# Patient Record
Sex: MALE | Race: WHITE | Employment: FULL TIME | URBAN - METROPOLITAN AREA
[De-identification: names, ages, dates, MRNs, and addresses within clinical notes are randomized per-mention and may not be internally consistent; named-entity substitution may affect disease eponyms.]

---

## 2019-04-08 ENCOUNTER — OFFICE VISIT (OUTPATIENT)
Dept: PRIMARY CARE CLINIC | Age: 29
End: 2019-04-08
Payer: COMMERCIAL

## 2019-04-08 VITALS
TEMPERATURE: 98.8 F | BODY MASS INDEX: 30.46 KG/M2 | WEIGHT: 212.8 LBS | HEIGHT: 70 IN | SYSTOLIC BLOOD PRESSURE: 122 MMHG | OXYGEN SATURATION: 98 % | DIASTOLIC BLOOD PRESSURE: 72 MMHG | HEART RATE: 93 BPM

## 2019-04-08 DIAGNOSIS — J02.0 STREP THROAT: Primary | ICD-10-CM

## 2019-04-08 LAB — S PYO AG THROAT QL: POSITIVE

## 2019-04-08 PROCEDURE — 87880 STREP A ASSAY W/OPTIC: CPT | Performed by: PHYSICIAN ASSISTANT

## 2019-04-08 PROCEDURE — G8427 DOCREV CUR MEDS BY ELIG CLIN: HCPCS | Performed by: PHYSICIAN ASSISTANT

## 2019-04-08 PROCEDURE — 99213 OFFICE O/P EST LOW 20 MIN: CPT | Performed by: PHYSICIAN ASSISTANT

## 2019-04-08 PROCEDURE — 1036F TOBACCO NON-USER: CPT | Performed by: PHYSICIAN ASSISTANT

## 2019-04-08 PROCEDURE — G8419 CALC BMI OUT NRM PARAM NOF/U: HCPCS | Performed by: PHYSICIAN ASSISTANT

## 2019-04-08 RX ORDER — AMOXICILLIN 500 MG/1
500 CAPSULE ORAL 2 TIMES DAILY
Qty: 20 CAPSULE | Refills: 0 | Status: SHIPPED | OUTPATIENT
Start: 2019-04-08 | End: 2019-04-18

## 2019-04-08 ASSESSMENT — PATIENT HEALTH QUESTIONNAIRE - PHQ9
SUM OF ALL RESPONSES TO PHQ9 QUESTIONS 1 & 2: 0
2. FEELING DOWN, DEPRESSED OR HOPELESS: 0
SUM OF ALL RESPONSES TO PHQ QUESTIONS 1-9: 0
1. LITTLE INTEREST OR PLEASURE IN DOING THINGS: 0
SUM OF ALL RESPONSES TO PHQ QUESTIONS 1-9: 0

## 2019-04-08 NOTE — PROGRESS NOTES
[address] [facility] PRIMARY CARE  [id number] [facility]  Dept: 350 Sara Skinner is a 25 y.o. male who presents today for his medical conditions/complaints as noted below. Chief Complaint   Patient presents with    Pharyngitis     Pt states sore throat started last night. Pt denies cough    Emesis     Pt states he vomited once this morning.  Fever     Fever of 100 this morning. Pt has taken Tylenol- last dose 930 this morning. HPI:     HPI   Severe sore throat since last night. Vomited once this morning when trying to swallow. No diarrhea. No abdominal pain. +HA. Max temp of 100F. No congestion or runny nose. Only coughs due to throat irrigation. Strep throat is going around his son's . No results found for: LDLCHOLESTEROL, LDLCALC    (goal LDL is <100)   No results found for: AST, ALT, BUN  BP Readings from Last 3 Encounters:   04/11/19 122/78   04/08/19 122/72   12/18/15 111/66          (goal 120/80)    History reviewed. No pertinent past medical history. Past Surgical History:   Procedure Laterality Date    PILONIDAL CYST EXCISION  12-18-15       Family History   Problem Relation Age of Onset    High Blood Pressure Father     Heart Attack Father     High Cholesterol Father     High Cholesterol Paternal Uncle     Diabetes Maternal Aunt        Social History     Tobacco Use    Smoking status: Never Smoker    Smokeless tobacco: Never Used   Substance Use Topics    Alcohol use: Yes     Comment: rare      Current Outpatient Medications   Medication Sig Dispense Refill    amoxicillin (AMOXIL) 500 MG capsule Take 1 capsule by mouth 2 times daily for 10 days 20 capsule 0     No current facility-administered medications for this visit.       No Known Allergies    Health Maintenance   Topic Date Due    Varicella Vaccine (1 of 2 - 13+ 2-dose series) 10/25/2001    HIV screen  10/25/2003    Flu vaccine (Season

## 2019-04-11 ENCOUNTER — OFFICE VISIT (OUTPATIENT)
Dept: PRIMARY CARE CLINIC | Age: 29
End: 2019-04-11
Payer: COMMERCIAL

## 2019-04-11 VITALS
WEIGHT: 210.6 LBS | DIASTOLIC BLOOD PRESSURE: 78 MMHG | OXYGEN SATURATION: 98 % | SYSTOLIC BLOOD PRESSURE: 122 MMHG | HEIGHT: 69 IN | BODY MASS INDEX: 31.19 KG/M2 | HEART RATE: 84 BPM

## 2019-04-11 DIAGNOSIS — Z13.6 ENCOUNTER FOR LIPID SCREENING FOR CARDIOVASCULAR DISEASE: ICD-10-CM

## 2019-04-11 DIAGNOSIS — Z02.1 PHYSICAL EXAM, PRE-EMPLOYMENT: Primary | ICD-10-CM

## 2019-04-11 DIAGNOSIS — Z13.1 SCREENING FOR DIABETES MELLITUS (DM): ICD-10-CM

## 2019-04-11 DIAGNOSIS — Z13.220 ENCOUNTER FOR LIPID SCREENING FOR CARDIOVASCULAR DISEASE: ICD-10-CM

## 2019-04-11 PROCEDURE — 99395 PREV VISIT EST AGE 18-39: CPT | Performed by: PHYSICIAN ASSISTANT

## 2019-04-11 ASSESSMENT — ENCOUNTER SYMPTOMS
NAUSEA: 0
DIARRHEA: 0
COUGH: 0
BLOOD IN STOOL: 0
VOMITING: 0
BACK PAIN: 0
CONSTIPATION: 0
SHORTNESS OF BREATH: 0
RHINORRHEA: 0
ABDOMINAL PAIN: 0

## 2019-04-11 NOTE — PROGRESS NOTES
Vaccine (1 of 2 - 13+ 2-dose series) 10/25/2001    HIV screen  10/25/2003    Flu vaccine (Season Ended) 09/01/2019    DTaP/Tdap/Td vaccine (3 - Td) 09/22/2025    Pneumococcal 0-64 years Vaccine  Aged Out       Subjective:      Review of Systems   Constitutional: Negative for fever. HENT: Negative for congestion, rhinorrhea and sore throat (resolved). Eyes: Negative for visual disturbance (wears glasses, has seen eye doctor in last year). Respiratory: Negative for cough and shortness of breath. Cardiovascular: Negative for chest pain. Gastrointestinal: Negative for abdominal pain, blood in stool, constipation, diarrhea, nausea and vomiting. Genitourinary: Negative for dysuria. Musculoskeletal: Negative for arthralgias and back pain. Skin: Negative for rash. Neurological: Negative for light-headedness and headaches. Psychiatric/Behavioral: Negative for dysphoric mood. The patient is not nervous/anxious. Objective:     /78   Pulse 84   Ht 5' 9\" (1.753 m)   Wt 210 lb 9.6 oz (95.5 kg)   SpO2 98%   BMI 31.10 kg/m²   Physical Exam   Constitutional: He appears well-developed and well-nourished. No distress. HENT:   Head: Normocephalic and atraumatic. Right Ear: Tympanic membrane, external ear and ear canal normal.   Left Ear: Tympanic membrane, external ear and ear canal normal.   Mouth/Throat: Posterior oropharyngeal erythema (mild) present. No oropharyngeal exudate. Eyes: Pupils are equal, round, and reactive to light. EOM are normal.   Cardiovascular: Normal rate, regular rhythm and normal heart sounds. Pulmonary/Chest: Effort normal and breath sounds normal.   Abdominal: Soft. Bowel sounds are normal. He exhibits no distension. There is no tenderness. There is no rebound and no guarding. Musculoskeletal: Normal range of motion. Normal strength   Lymphadenopathy:     He has no cervical adenopathy. Skin: He is not diaphoretic.    Psychiatric: He has a normal mood and affect. Nursing note and vitals reviewed. Assessment:       Diagnosis Orders   1. Physical exam, pre-employment  Lipid Panel    Glucose, Fasting   2. Screening for diabetes mellitus (DM)  Glucose, Fasting   3. Encounter for lipid screening for cardiovascular disease  Lipid Panel        Plan:    Signed form stating that pt able to do physical ability tests. Ordered screening labs for glucose and cholesterol. Return in about 1 year (around 4/11/2020) for Annual Physical.    Orders Placed This Encounter   Procedures    Lipid Panel     Standing Status:   Future     Standing Expiration Date:   4/11/2020     Order Specific Question:   Is Patient Fasting?/# of Hours     Answer:   yes    Glucose, Fasting     Standing Status:   Future     Standing Expiration Date:   4/11/2020     No orders of the defined types were placed in this encounter. Patient given educationalmaterials - see patient instructions. Discussed use, benefit, and side effectsof prescribed medications. All patient questions answered. Pt voiced understanding. Reviewed health maintenance. Instructed to continue current medications, diet andexercise. Patient agreed with treatment plan. Follow up as directed.      Electronicallysigned by Bessy Whiting PA-C on 4/17/2019 at 10:39 AM

## 2019-04-14 ASSESSMENT — ENCOUNTER SYMPTOMS
ABDOMINAL PAIN: 0
RHINORRHEA: 0
VOMITING: 1
DIARRHEA: 0
SORE THROAT: 1

## 2019-04-17 ASSESSMENT — ENCOUNTER SYMPTOMS: SORE THROAT: 0

## 2019-05-09 ENCOUNTER — OFFICE VISIT (OUTPATIENT)
Dept: PRIMARY CARE CLINIC | Age: 29
End: 2019-05-09
Payer: COMMERCIAL

## 2019-05-09 VITALS
BODY MASS INDEX: 30.6 KG/M2 | SYSTOLIC BLOOD PRESSURE: 112 MMHG | OXYGEN SATURATION: 99 % | WEIGHT: 207.2 LBS | TEMPERATURE: 98.2 F | DIASTOLIC BLOOD PRESSURE: 74 MMHG | HEART RATE: 72 BPM

## 2019-05-09 DIAGNOSIS — J20.9 ACUTE BRONCHITIS, UNSPECIFIED ORGANISM: Primary | ICD-10-CM

## 2019-05-09 PROCEDURE — 99213 OFFICE O/P EST LOW 20 MIN: CPT | Performed by: PHYSICIAN ASSISTANT

## 2019-05-09 PROCEDURE — G8427 DOCREV CUR MEDS BY ELIG CLIN: HCPCS | Performed by: PHYSICIAN ASSISTANT

## 2019-05-09 PROCEDURE — 1036F TOBACCO NON-USER: CPT | Performed by: PHYSICIAN ASSISTANT

## 2019-05-09 PROCEDURE — G8417 CALC BMI ABV UP PARAM F/U: HCPCS | Performed by: PHYSICIAN ASSISTANT

## 2019-05-09 RX ORDER — AZITHROMYCIN 250 MG/1
TABLET, FILM COATED ORAL
Qty: 1 PACKET | Refills: 0 | Status: SHIPPED | OUTPATIENT
Start: 2019-05-09 | End: 2019-05-19

## 2019-05-09 RX ORDER — BENZONATATE 100 MG/1
100 CAPSULE ORAL 3 TIMES DAILY PRN
Qty: 30 CAPSULE | Refills: 0 | Status: SHIPPED | OUTPATIENT
Start: 2019-05-09 | End: 2020-02-17

## 2019-05-09 ASSESSMENT — ENCOUNTER SYMPTOMS
SHORTNESS OF BREATH: 0
WHEEZING: 0
COUGH: 1
CHEST TIGHTNESS: 0

## 2019-05-09 NOTE — PROGRESS NOTES
Topic Date Due    Varicella Vaccine (1 of 2 - 13+ 2-dose series) 10/25/2001    HIV screen  10/25/2003    Flu vaccine (Season Ended) 09/01/2019    DTaP/Tdap/Td vaccine (3 - Td) 09/22/2025    Pneumococcal 0-64 years Vaccine  Aged Out       Subjective:      Review of Systems   Constitutional: Negative for fever. HENT: Positive for congestion. Negative for ear pain, rhinorrhea, sinus pressure and sore throat. Respiratory: Positive for cough. Negative for chest tightness, shortness of breath and wheezing. Objective:     /74   Pulse 72   Temp 98.2 °F (36.8 °C)   Wt 207 lb 3.2 oz (94 kg)   SpO2 99%   BMI 30.60 kg/m²   Physical Exam   Constitutional: He appears well-developed and well-nourished. No distress. HENT:   Head: Normocephalic and atraumatic. Right Ear: Tympanic membrane, external ear and ear canal normal.   Left Ear: External ear and ear canal normal. Tympanic membrane is injected (mild). Tympanic membrane is not bulging. Nose: Right sinus exhibits no maxillary sinus tenderness and no frontal sinus tenderness. Left sinus exhibits no maxillary sinus tenderness and no frontal sinus tenderness. Mouth/Throat: No oropharyngeal exudate. Cardiovascular: Normal rate, regular rhythm and normal heart sounds. Pulmonary/Chest: Effort normal and breath sounds normal. No respiratory distress. He has no wheezes. Lymphadenopathy:     He has no cervical adenopathy. Skin: Skin is warm and dry. No rash noted. He is not diaphoretic. Nursing note and vitals reviewed. Assessment:       Diagnosis Orders   1. Acute bronchitis, unspecified organism  azithromycin (ZITHROMAX) 250 MG tablet    benzonatate (TESSALON) 100 MG capsule        Plan:    -Rx for z-rosa since pt has been coughing x 3 weeks.   -Continue mucinex in day for chest congestion, but can try perles at night to suppress cough. Return if symptoms worsen or fail to improve.     No orders of the defined types were placed in this encounter. Orders Placed This Encounter   Medications    azithromycin (ZITHROMAX) 250 MG tablet     Si tablets now then 1 daily until gone. Dispense:  1 packet     Refill:  0    benzonatate (TESSALON) 100 MG capsule     Sig: Take 1 capsule by mouth 3 times daily as needed for Cough     Dispense:  30 capsule     Refill:  0       Patient given educationalmaterials - see patient instructions. Discussed use, benefit, and side effectsof prescribed medications. All patient questions answered. Pt voiced understanding. Reviewed health maintenance. Instructed to continue current medications, diet andexercise. Patient agreed with treatment plan. Follow up as directed.      Electronicallysigned by Ankita Sanchez PA-C on 5/15/2019 at 10:49 PM

## 2019-05-15 ASSESSMENT — ENCOUNTER SYMPTOMS
RHINORRHEA: 0
SORE THROAT: 0
SINUS PRESSURE: 0

## 2019-05-24 LAB
ALBUMIN SERPL-MCNC: NORMAL G/DL
ALP BLD-CCNC: NORMAL U/L
ALT SERPL-CCNC: NORMAL U/L
ANION GAP SERPL CALCULATED.3IONS-SCNC: NORMAL MMOL/L
AST SERPL-CCNC: NORMAL U/L
BILIRUB SERPL-MCNC: NORMAL MG/DL (ref 0.1–1.4)
BILIRUBIN, URINE: NORMAL
BLOOD, URINE: NORMAL
BUN BLDV-MCNC: NORMAL MG/DL
CALCIUM SERPL-MCNC: NORMAL MG/DL
CHLORIDE BLD-SCNC: NORMAL MMOL/L
CHOLESTEROL, TOTAL: 164 MG/DL
CHOLESTEROL/HDL RATIO: 4.2
CLARITY: NORMAL
CO2: NORMAL MMOL/L
COLOR: NORMAL
CREAT SERPL-MCNC: NORMAL MG/DL
GFR CALCULATED: NORMAL
GLUCOSE BLD-MCNC: 92 MG/DL
GLUCOSE URINE: NORMAL
HDLC SERPL-MCNC: 39 MG/DL (ref 35–70)
KETONES, URINE: NORMAL
LDL CHOLESTEROL CALCULATED: 110 MG/DL (ref 0–160)
LEUKOCYTE ESTERASE, URINE: NORMAL
NITRITE, URINE: NORMAL
PH UA: NORMAL (ref 4.5–8)
POTASSIUM SERPL-SCNC: NORMAL MMOL/L
PROTEIN UA: NORMAL
SODIUM BLD-SCNC: NORMAL MMOL/L
SPECIFIC GRAVITY, URINE: NORMAL
TOTAL PROTEIN: NORMAL
TRIGL SERPL-MCNC: 66 MG/DL
UROBILINOGEN, URINE: NORMAL
VLDLC SERPL CALC-MCNC: NORMAL MG/DL

## 2019-10-22 ENCOUNTER — TELEPHONE (OUTPATIENT)
Dept: PRIMARY CARE CLINIC | Age: 29
End: 2019-10-22

## 2020-02-17 ENCOUNTER — OFFICE VISIT (OUTPATIENT)
Dept: PRIMARY CARE CLINIC | Age: 30
End: 2020-02-17
Payer: COMMERCIAL

## 2020-02-17 ENCOUNTER — NURSE TRIAGE (OUTPATIENT)
Dept: OTHER | Facility: CLINIC | Age: 30
End: 2020-02-17

## 2020-02-17 VITALS
SYSTOLIC BLOOD PRESSURE: 110 MMHG | HEART RATE: 74 BPM | WEIGHT: 208.6 LBS | BODY MASS INDEX: 30.8 KG/M2 | DIASTOLIC BLOOD PRESSURE: 74 MMHG | OXYGEN SATURATION: 98 %

## 2020-02-17 PROCEDURE — 99213 OFFICE O/P EST LOW 20 MIN: CPT | Performed by: PHYSICIAN ASSISTANT

## 2020-02-17 ASSESSMENT — PATIENT HEALTH QUESTIONNAIRE - PHQ9
SUM OF ALL RESPONSES TO PHQ QUESTIONS 1-9: 0
SUM OF ALL RESPONSES TO PHQ9 QUESTIONS 1 & 2: 0
SUM OF ALL RESPONSES TO PHQ QUESTIONS 1-9: 0
2. FEELING DOWN, DEPRESSED OR HOPELESS: 0
1. LITTLE INTEREST OR PLEASURE IN DOING THINGS: 0

## 2020-02-17 ASSESSMENT — ENCOUNTER SYMPTOMS
RESPIRATORY NEGATIVE: 1
EYES NEGATIVE: 1
ABDOMINAL PAIN: 0
COLOR CHANGE: 0

## 2020-02-17 NOTE — PROGRESS NOTES
[facility] Primary Care  [address]  Phone: [phone]  Fax: [phone]    Rachel Magallanes is a 30 y.o. male who presents today for his medical conditions/complaintsas noted below. Chief Complaint   Patient presents with    Rash     patient has rash on the side of his right wye, patient said rash does not itch but was tender to the touch last week. HPI:     HPI  Rash around right eye, stared last week. NO new exposures. Wears daily disposable contacts. NO eye drops. Has used Eucerin that has helped. Also stopped wearing his contacts. No hx of eczema, but does have sensitive skin. Current Outpatient Medications   Medication Sig Dispense Refill    Crisaborole (EUCRISA) 2 % OINT Apply topically bid for 3-5 days 1 Tube 0     No current facility-administered medications for this visit. No Known Allergies    Subjective:      Review of Systems   Constitutional: Negative for chills, diaphoresis, fever and unexpected weight change. HENT: Negative. Negative for mouth sores. Eyes: Negative. Respiratory: Negative. Cardiovascular: Negative. Gastrointestinal: Negative for abdominal pain. Musculoskeletal: Negative for arthralgias and myalgias. Skin: Positive for rash ( near right eye). Negative for color change, pallor and wound. Allergic/Immunologic: Negative for environmental allergies, food allergies and immunocompromised state. Hematological: Negative for adenopathy. Objective:     /74   Pulse 74   Wt 208 lb 9.6 oz (94.6 kg)   SpO2 98%   BMI 30.80 kg/m²   Physical Exam  Skin:     Findings: Rash (maculopapular rash that looks like something is dripping out of eye and settling causing the rash. ) present. Assessment:       Diagnosis Orders   1. Irritant contact dermatitis, unspecified trigger          Plan:    Eucrisa sample given and explained how to use.   May be chronic condition and then would need

## 2020-02-17 NOTE — TELEPHONE ENCOUNTER
Reason for Disposition   Patient wants to be seen    Protocols used: RASH OR REDNESS - LOCALIZED-ADULT-[place]    Patient called [facility]) to schedule appointment, with red flag complaint, transferred to RN access for triage. Reports having rash to area beside eye. Denies any involvement to eyeball. States rash began about 1.5 weeks ago and has not spread. States has been taking OTC anithistamines and lotions without relief. Patient informed of disposition. Care advice as documented. Instructed patient to call back with worsening symptoms. Soft transfer to pre-service center to schedule appointment as recommended. Please do not respond to the triage nurse through this encounter. Any subsequent communication should be directly with the patient.

## 2020-03-05 ENCOUNTER — TELEPHONE (OUTPATIENT)
Dept: PRIMARY CARE CLINIC | Age: 30
End: 2020-03-05

## 2020-03-05 NOTE — TELEPHONE ENCOUNTER
Patient was seen on 2/17/2020 with Abdirashid Moore for contact dermatitis, was given Eucrisa sample and was told to call back if not any better. Patient states that it did get better but it is still there. He is requesting the generic Eucrisa that was discussed at the visit. Please send to [facility] in [place].

## 2020-03-06 RX ORDER — PIMECROLIMUS 10 MG/G
CREAM TOPICAL
Qty: 1 TUBE | Refills: 0 | Status: SHIPPED | OUTPATIENT
Start: 2020-03-06 | End: 2020-07-22

## 2020-03-19 ENCOUNTER — TELEPHONE (OUTPATIENT)
Dept: PRIMARY CARE CLINIC | Age: 30
End: 2020-03-19

## 2020-03-19 NOTE — TELEPHONE ENCOUNTER
Tremaine Clubs wanted to know if Karina Quinones worked well for pt. Left message for pt to call office back.

## 2020-03-26 RX ORDER — ALCLOMETASONE DIPROPIONATE 0.5 MG/G
CREAM TOPICAL
Qty: 15 G | Refills: 0 | Status: SHIPPED | OUTPATIENT
Start: 2020-03-26 | End: 2020-06-04 | Stop reason: ALTCHOICE

## 2020-03-26 NOTE — TELEPHONE ENCOUNTER
Sent Aclovate which is a steroid cream so please tell patient to use thinly and not more than twice daily for one week.

## 2020-06-03 ENCOUNTER — TELEPHONE (OUTPATIENT)
Dept: PRIMARY CARE CLINIC | Age: 30
End: 2020-06-03

## 2020-06-04 RX ORDER — ALCLOMETASONE DIPROPIONATE 0.5 MG/G
CREAM TOPICAL
Qty: 15 G | Refills: 0 | Status: SHIPPED | OUTPATIENT
Start: 2020-06-04 | End: 2021-05-17 | Stop reason: ALTCHOICE

## 2020-07-02 ENCOUNTER — TELEPHONE (OUTPATIENT)
Dept: PRIMARY CARE CLINIC | Age: 30
End: 2020-07-02

## 2020-07-02 NOTE — TELEPHONE ENCOUNTER
Ok, I put in orders for lipids and glucose since that is what he needed last year.  Please contact pt to let him know

## 2020-07-02 NOTE — TELEPHONE ENCOUNTER
Pt returned the call about his Lab Orders- He stated it is his yearly Labs that need to be done for his job. He stated they should be in the system from last year along with the paperwork that needs filled out at the appointment. Pt has the copies of the paperwork if you need the exact Labs that need ordered. If you have questions, please call pt and let him know. If not, please call pt to let him know when the lab orders are complete.  Thank you

## 2020-07-17 ENCOUNTER — HOSPITAL ENCOUNTER (OUTPATIENT)
Age: 30
Discharge: HOME | End: 2020-07-17
Payer: COMMERCIAL

## 2020-07-17 LAB
CHOLESTEROL, FASTING: 184 MG/DL
CHOLESTEROL/HDL RATIO: 4.3
GLUCOSE FASTING: 105 MG/DL (ref 70–99)
HDLC SERPL-MCNC: 43 MG/DL
LDL CHOLESTEROL: 123 MG/DL (ref 0–130)
TRIGLYCERIDE, FASTING: 90 MG/DL
VLDLC SERPL CALC-MCNC: NORMAL MG/DL (ref 1–30)

## 2020-07-17 PROCEDURE — 80061 LIPID PANEL: CPT

## 2020-07-17 PROCEDURE — 82947 ASSAY GLUCOSE BLOOD QUANT: CPT

## 2020-07-17 PROCEDURE — 36415 COLL VENOUS BLD VENIPUNCTURE: CPT

## 2020-07-22 ENCOUNTER — OFFICE VISIT (OUTPATIENT)
Dept: PRIMARY CARE CLINIC | Age: 30
End: 2020-07-22
Payer: COMMERCIAL

## 2020-07-22 VITALS
OXYGEN SATURATION: 99 % | SYSTOLIC BLOOD PRESSURE: 126 MMHG | HEIGHT: 72 IN | BODY MASS INDEX: 27.3 KG/M2 | TEMPERATURE: 97.3 F | WEIGHT: 201.6 LBS | HEART RATE: 74 BPM | DIASTOLIC BLOOD PRESSURE: 80 MMHG

## 2020-07-22 PROCEDURE — 99395 PREV VISIT EST AGE 18-39: CPT | Performed by: PHYSICIAN ASSISTANT

## 2020-07-22 ASSESSMENT — ENCOUNTER SYMPTOMS
BLOOD IN STOOL: 0
DIARRHEA: 0
SORE THROAT: 0
NAUSEA: 0
VOMITING: 0
BACK PAIN: 0
COUGH: 1
ABDOMINAL PAIN: 0
SHORTNESS OF BREATH: 0
WHEEZING: 0

## 2020-07-22 NOTE — PROGRESS NOTES
[facility] PRIMARY CARE  [address] [place] [address]  [address] [address]  [facility] is a 30 y.o. male who presents today for his medical conditions/complaintsas noted below. Chief Complaint   Patient presents with    Annual Exam     For health insurance. Has form    Rash     Rash around eyes has not cleared up from last visit. HPI:     HPI   Physical:   Already had glucose and lipids drawn on 7/17/2020: His glucose was a little high at 105. Does eat a lot of donuts 1-2x per week. No pop. Only occasional ice cream.   Also eats a lot of cereal.   Exercise: Runs 3x per week for 1.5 miles. Not going to gym currently during the pandemic. Still also bike rides and Cindy. Mostly eats chicken moreso than red meat. Going to dentist next month. Pt saw GABBY Alexandra for rash around his eyes in February. Says he still get this since January. Says it is not itchy, but occasionally tender when it is worse. Says it is only underneath his eyes. Nothing on upper eyelids. No symptoms in the eyes themselves- denies eye redness, itching, or drainage. Takes OTC 24 hr [facility] allergy med for congestion. Has had a cat and dog for years. Says she already tried changing his laundry detergent to sensitive kind in case it was his pillow case causing the rash. Was using Aloe on his face. Aveeno eczema lotion made it worse. Currently using Neutrogena Hydroboost lotion. Not using any soap/facial wash. Tried to stop using his Head and Shoulders shampoo for a little bit, but didn't notice any improvement in the rash. Elidel was too expensive. Using aclovate cream occasionally, which seems to help briefly while he is using it, but rash returns. Family Hx: Mother had eczema.           LDL Cholesterol (mg/dL)   Date Value   07/17/2020 123     LDL Calculated (mg/dL)   Date Value   05/24/2019 110       (goal LDL is <100)   No results found for: AST, ALT, BUN  BP Readings from Last 3 Encounters:   07/22/20 126/80   02/17/20 110/74   05/09/19 112/74          (goal 120/80)    No past medical history on file. Past Surgical History:   Procedure Laterality Date    PILONIDAL CYST EXCISION  12-18-15       Family History   Problem Relation Age of Onset    High Blood Pressure Father     Heart Attack Father     High Cholesterol Father     High Cholesterol Paternal Uncle     Diabetes Maternal Aunt     Eczema Mother        Social History     Tobacco Use    Smoking status: Never Smoker    Smokeless tobacco: Never Used   Substance Use Topics    Alcohol use: Yes     Comment: rare      Current Outpatient Medications   Medication Sig Dispense Refill    alclomethasone (ACLOVATE) 0.05 % cream Apply topically 2 times daily. 15 g 0     No current facility-administered medications for this visit. No Known Allergies    Health Maintenance   Topic Date Due    Hepatitis A vaccine (2 of 2 - 2-dose series) 04/08/2008    Varicella vaccine (1 of 2 - 2-dose childhood series) 08/12/2020 (Originally 10/25/1989)    HIV screen  07/22/2021 (Originally 10/25/2003)    Flu vaccine (1) 09/01/2020    DTaP/Tdap/Td vaccine (3 - Td) 09/22/2025    Meningococcal (ACWY) vaccine  Completed    Hepatitis B vaccine  Aged Out    Hib vaccine  Aged Out    Pneumococcal 0-64 years Vaccine  Aged Out       Subjective:      Review of Systems   Constitutional: Negative for fever. HENT: Negative for congestion and sore throat. Eyes: Negative for discharge, redness, itching and visual disturbance (wears contacts, sees eye doctor yearly). Respiratory: Positive for cough (only with laughing, none otherwise). Negative for shortness of breath and wheezing. Cardiovascular: Negative for chest pain and leg swelling. Gastrointestinal: Negative for abdominal pain, blood in stool, diarrhea, nausea and vomiting. Genitourinary: Negative for dysuria.    Musculoskeletal: Negative for arthralgias and back pain. Skin: Positive for rash (around eyes). Neurological: Negative for light-headedness and headaches. Psychiatric/Behavioral: Negative for dysphoric mood. The patient is not nervous/anxious. Objective:     /80   Pulse 74   Temp 97.3 °F (36.3 °C)   Ht 5' 11.5\" (1.816 m)   Wt 201 lb 9.6 oz (91.4 kg)   SpO2 99%   BMI 27.73 kg/m²   Physical Exam  Vitals signs and nursing note reviewed. Constitutional:       Appearance: Normal appearance. HENT:      Head: Normocephalic and atraumatic. Right Ear: Tympanic membrane, ear canal and external ear normal.      Left Ear: Tympanic membrane, ear canal and external ear normal.   Eyes:      General:         Right eye: No discharge. Left eye: No discharge. Extraocular Movements: Extraocular movements intact. Pupils: Pupils are equal, round, and reactive to light. Cardiovascular:      Rate and Rhythm: Normal rate and regular rhythm. Pulmonary:      Effort: Pulmonary effort is normal.      Breath sounds: Normal breath sounds. Abdominal:      General: Abdomen is flat. Bowel sounds are normal. There is no distension. Tenderness: There is no abdominal tenderness. There is no guarding or rebound. Neurological:      Mental Status: He is alert. Psychiatric:         Mood and Affect: Mood normal.         Assessment:       Diagnosis Orders   1. Annual physical exam     2. Dermatitis of eyelids of both eyes, unspecified type          Plan:     1. Signed form   Encouraged pt to decrease donut and cereal intake to improve glucose. 2. Try CeraVe moisturizer. Pt said he wanted to try a few more things, such as switching his shave gel on his face, but if not improving he will call back for referral to dermatology. Return in about 1 year (around 7/22/2021) for Annual Physical.    No orders of the defined types were placed in this encounter.     No orders of the defined types were placed in this encounter. Patient given educationalmaterials - see patient instructions. Discussed use, benefit, and side effectsof prescribed medications. All patient questions answered. Pt voiced understanding. Reviewed health maintenance. Instructed to continue current medications, diet andexercise. Patient agreed with treatment plan. Follow up as directed.      Electronicallysigned by Nina Frausto PA-C on 7/23/2020 at 12:14 AM

## 2020-07-23 PROBLEM — H01.9 DERMATITIS OF EYELIDS OF BOTH EYES: Status: ACTIVE | Noted: 2020-07-23

## 2020-07-23 ASSESSMENT — ENCOUNTER SYMPTOMS
EYE ITCHING: 0
EYE DISCHARGE: 0
EYE REDNESS: 0

## 2020-10-15 ENCOUNTER — TELEPHONE (OUTPATIENT)
Dept: PRIMARY CARE CLINIC | Age: 30
End: 2020-10-15

## 2020-10-15 NOTE — TELEPHONE ENCOUNTER
Follow CDC guidelines:  If any of the below is true, pt should self quarantine for 14 days after last contact with his brother-in-law (not work outside the home) unless his brother in [address] [place] [address] results are negative. What counts as close contact?   You were within 6 feet of someone who has COVID-19 for a total of 15 minutes or more   You provided care at home to someone who is sick with COVID-19   You had direct physical contact with the person (hugged or kissed them)   You shared eating or drinking utensils   They sneezed, coughed, or somehow got respiratory droplets on you

## 2020-10-15 NOTE — TELEPHONE ENCOUNTER
Patient states on Tuesday he was around his brother in law for a few hrs, his brother in law has developed covid sx and was tested for it yesterday. Test is pending but now pt's employer is asking if he is ok to work? I told him that it should be up to the employer but they are asking for something in writing.     Fax to [phone]

## 2020-10-22 ENCOUNTER — PATIENT MESSAGE (OUTPATIENT)
Dept: PRIMARY CARE CLINIC | Age: 30
End: 2020-10-22

## 2020-10-22 NOTE — TELEPHONE ENCOUNTER
[facility] from [facility] says pt was last exposed 10/8 and results are negative in chart, so can give note to return on 15th day Friday 10/23. Needs to let us know if he develops other symptoms with the diarrhea though, such as cough, fever, loss of taste/smell and should not go to work if he develops those.

## 2020-10-22 NOTE — TELEPHONE ENCOUNTER
Pt called in asking for a work note for Monday, I advised him that Wednesday would be the 15th day and when he can be done isolating. Pt is now stating the last day of exposure was the 8th. Pt was tested 10/18 and was negative. Patient does have congestion, and diarrhea starting yesterday. [id number] [address]z for note to return 10/26?

## 2021-05-17 ENCOUNTER — OFFICE VISIT (OUTPATIENT)
Dept: PRIMARY CARE CLINIC | Age: 31
End: 2021-05-17
Payer: COMMERCIAL

## 2021-05-17 VITALS
DIASTOLIC BLOOD PRESSURE: 72 MMHG | HEIGHT: 69 IN | OXYGEN SATURATION: 98 % | HEART RATE: 90 BPM | BODY MASS INDEX: 30.01 KG/M2 | WEIGHT: 202.6 LBS | SYSTOLIC BLOOD PRESSURE: 118 MMHG

## 2021-05-17 DIAGNOSIS — Z13.6 ENCOUNTER FOR LIPID SCREENING FOR CARDIOVASCULAR DISEASE: ICD-10-CM

## 2021-05-17 DIAGNOSIS — Z00.00 ANNUAL PHYSICAL EXAM: Primary | ICD-10-CM

## 2021-05-17 DIAGNOSIS — Z13.220 ENCOUNTER FOR LIPID SCREENING FOR CARDIOVASCULAR DISEASE: ICD-10-CM

## 2021-05-17 DIAGNOSIS — Z13.1 SCREENING FOR DIABETES MELLITUS (DM): ICD-10-CM

## 2021-05-17 PROCEDURE — 99395 PREV VISIT EST AGE 18-39: CPT | Performed by: PHYSICIAN ASSISTANT

## 2021-05-17 RX ORDER — PIMECROLIMUS 10 MG/G
CREAM TOPICAL 2 TIMES DAILY
COMMUNITY

## 2021-05-17 SDOH — ECONOMIC STABILITY: FOOD INSECURITY: WITHIN THE PAST 12 MONTHS, THE FOOD YOU BOUGHT JUST DIDN'T LAST AND YOU DIDN'T HAVE MONEY TO GET MORE.: NEVER TRUE

## 2021-05-17 SDOH — ECONOMIC STABILITY: FOOD INSECURITY: WITHIN THE PAST 12 MONTHS, YOU WORRIED THAT YOUR FOOD WOULD RUN OUT BEFORE YOU GOT MONEY TO BUY MORE.: NEVER TRUE

## 2021-05-17 ASSESSMENT — SOCIAL DETERMINANTS OF HEALTH (SDOH): HOW HARD IS IT FOR YOU TO PAY FOR THE VERY BASICS LIKE FOOD, HOUSING, MEDICAL CARE, AND HEATING?: NOT HARD AT ALL

## 2021-05-17 ASSESSMENT — ENCOUNTER SYMPTOMS
NAUSEA: 0
VOMITING: 0
ABDOMINAL PAIN: 0
COUGH: 0
CONSTIPATION: 0
BACK PAIN: 0
BLOOD IN STOOL: 0
DIARRHEA: 0
SORE THROAT: 0
SHORTNESS OF BREATH: 0

## 2021-05-17 ASSESSMENT — PATIENT HEALTH QUESTIONNAIRE - PHQ9
1. LITTLE INTEREST OR PLEASURE IN DOING THINGS: 0
SUM OF ALL RESPONSES TO PHQ QUESTIONS 1-9: 0

## 2021-05-17 NOTE — PROGRESS NOTES
[facility] PRIMARY CARE  [address]  [address] [address]  Dept: 350 [facility] Shelli Connor is a 26 y.o. male Established patient, who presents today for his medical conditions/complaints as noted below. Chief Complaint   Patient presents with    Annual Exam     Pt here for a physical for insurance- Pt needs blood work done        HPI:     HPI   No concerns. Has insurance physical form. Fasting glucose was 105 last year. Cut back on cereal.   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 Exercise: Does online workout programs 2-3x per week (jumping jacks, etc). He hasn't yet this year, but also usually does bike riding and kayaking in the summer. UTD with dentist.     Zachary Segovia working well for eyelid dermatitis. Reviewed prior notes Dermatology  Reviewed previous Labs- Glucose and lipids from 7/17/20    LDL Cholesterol (mg/dL)   Date Value   07/17/2020 123     LDL Calculated (mg/dL)   Date Value   05/24/2019 110       (goal LDL is <100)   TSH (mIU/L)   Date Value   12/01/2015 2.20     BP Readings from Last 3 Encounters:   05/17/21 118/72   07/22/20 126/80   02/17/20 110/74          (goal 120/80)    No past medical history on file. Past Surgical History:   Procedure Laterality Date    PILONIDAL CYST EXCISION  12-18-15       Family History   Problem Relation Age of Onset    High Blood Pressure Father     Heart Attack Father     High Cholesterol Father     High Cholesterol Paternal Uncle     Diabetes Maternal Aunt     Eczema Mother        Social History     Tobacco Use    Smoking status: Never Smoker    Smokeless tobacco: Never Used   Substance Use Topics    Alcohol use: Yes     Comment: rare      Current Outpatient Medications   Medication Sig Dispense Refill    pimecrolimus (ELIDEL) 1 % cream Apply topically 2 times daily Apply topically 2 times daily.        No current facility-administered medications for this visit. No Known Allergies    Health Maintenance   Topic Date Due    Hepatitis A vaccine (2 of 2 - 2-dose series) 04/08/2008    Varicella vaccine (1 of 2 - 2-dose childhood series) Never done    COVID-19 Vaccine (2 - Pfizer 2-dose series) 05/18/2021    HIV screen  07/22/2021 (Originally 10/25/2003)    Hepatitis C screen  05/17/2022 (Originally 1988)    Flu vaccine (Season Ended) 09/01/2021    DTaP/Tdap/Td vaccine (4 - Td) 09/22/2025    Hepatitis B vaccine  Completed    Meningococcal (ACWY) vaccine  Completed    Hib vaccine  Aged Out    Pneumococcal 0-64 years Vaccine  Aged Out       Subjective:      Review of Systems   Constitutional: Negative for fever. HENT: Negative for congestion and sore throat. Eyes: Negative for visual disturbance (UTD with eye doctor for his contacts). Respiratory: Negative for cough and shortness of breath. Cardiovascular: Negative for chest pain and leg swelling. Gastrointestinal: Negative for abdominal pain, blood in stool, constipation, diarrhea, nausea and vomiting. Genitourinary: Negative for dysuria. Musculoskeletal: Negative for arthralgias and back pain. Skin: Negative for rash (Eyelid dermatitis has improved with Elidel). Neurological: Negative for light-headedness and headaches. Psychiatric/Behavioral: Negative for dysphoric mood (Seeing therapist once per month, Gareth Flannery in [place], [place]). The patient is not nervous/anxious. Objective:     /72   Pulse 90   Ht 5' 9\" (1.753 m)   Wt 202 lb 9.6 oz (91.9 kg)   SpO2 98%   BMI 29.92 kg/m²   Physical Exam  Vitals and nursing note reviewed. Constitutional:       Appearance: Normal appearance. HENT:      Head: Normocephalic and atraumatic. Right Ear: Tympanic membrane, ear canal and external ear normal.      Left Ear: Tympanic membrane, ear canal and external ear normal.   Eyes:      Extraocular Movements: Extraocular movements intact.    Neck: Vascular: No carotid bruit. Cardiovascular:      Rate and Rhythm: Normal rate and regular rhythm. Comments: No lower leg edema  Pulmonary:      Effort: Pulmonary effort is normal.      Breath sounds: Normal breath sounds. Abdominal:      General: Bowel sounds are normal. There is no distension. Palpations: Abdomen is soft. Tenderness: There is no abdominal tenderness. There is no guarding or rebound. Neurological:      Mental Status: He is alert. Psychiatric:         Mood and Affect: Mood normal.            Assessment/Plan:   1. Annual physical exam  -     Glucose, Fasting; Future  -     Lipid, Fasting; Future  Will sign insurance form when I get his labs results: Pt wants form faxed and sent to him in [employer]. 2. Screening for diabetes mellitus (DM)  -     Glucose, Fasting; Future  3. Encounter for lipid screening for cardiovascular disease  -     Lipid, Fasting; Future      Return in about 1 year (around 5/17/2022) for Annual Physical.    Orders Placed This Encounter   Procedures    Glucose, Fasting     Standing Status:   Future     Standing Expiration Date:   5/17/2022    Lipid, Fasting     Standing Status:   Future     Standing Expiration Date:   5/17/2022     No orders of the defined types were placed in this encounter. Patient given educational materials - see patient instructions. Discussed use, benefit, and side effects of prescribed medications. All patient questions answered. Pt voiced understanding. Reviewed health maintenance. Instructed to continue current medications, diet and exercise. Patient agreed with treatment plan. Follow up as directed.      Electronically signed by Sg Mckinley PA-C on 5/17/2021 at 1:37 PM

## 2021-05-18 ENCOUNTER — HOSPITAL ENCOUNTER (OUTPATIENT)
Age: 31
Discharge: HOME | End: 2021-05-18
Payer: COMMERCIAL

## 2021-05-18 DIAGNOSIS — Z13.6 ENCOUNTER FOR LIPID SCREENING FOR CARDIOVASCULAR DISEASE: ICD-10-CM

## 2021-05-18 DIAGNOSIS — Z13.1 SCREENING FOR DIABETES MELLITUS (DM): ICD-10-CM

## 2021-05-18 DIAGNOSIS — Z00.00 ANNUAL PHYSICAL EXAM: ICD-10-CM

## 2021-05-18 DIAGNOSIS — Z13.220 ENCOUNTER FOR LIPID SCREENING FOR CARDIOVASCULAR DISEASE: ICD-10-CM

## 2021-05-18 LAB
CHOLESTEROL, FASTING: 172 MG/DL
CHOLESTEROL/HDL RATIO: 4.4
GLUCOSE FASTING: 118 MG/DL (ref 70–99)
HDLC SERPL-MCNC: 39 MG/DL
LDL CHOLESTEROL: 113 MG/DL (ref 0–130)
TRIGLYCERIDE, FASTING: 99 MG/DL
VLDLC SERPL CALC-MCNC: ABNORMAL MG/DL (ref 1–30)

## 2021-05-18 PROCEDURE — 36415 COLL VENOUS BLD VENIPUNCTURE: CPT

## 2021-05-18 PROCEDURE — 82947 ASSAY GLUCOSE BLOOD QUANT: CPT

## 2021-05-18 PROCEDURE — 80061 LIPID PANEL: CPT

## 2021-05-19 DIAGNOSIS — R73.01 ELEVATED FASTING GLUCOSE: Primary | ICD-10-CM

## 2022-08-20 SDOH — HEALTH STABILITY: PHYSICAL HEALTH: ON AVERAGE, HOW MANY DAYS PER WEEK DO YOU ENGAGE IN MODERATE TO STRENUOUS EXERCISE (LIKE A BRISK WALK)?: 4 DAYS

## 2022-08-20 SDOH — HEALTH STABILITY: PHYSICAL HEALTH: ON AVERAGE, HOW MANY MINUTES DO YOU ENGAGE IN EXERCISE AT THIS LEVEL?: 30 MIN

## 2022-08-20 ASSESSMENT — SOCIAL DETERMINANTS OF HEALTH (SDOH)
WITHIN THE LAST YEAR, HAVE YOU BEEN HUMILIATED OR EMOTIONALLY ABUSED IN OTHER WAYS BY YOUR PARTNER OR EX-PARTNER?: NO
WITHIN THE LAST YEAR, HAVE YOU BEEN KICKED, HIT, SLAPPED, OR OTHERWISE PHYSICALLY HURT BY YOUR PARTNER OR EX-PARTNER?: NO
WITHIN THE LAST YEAR, HAVE YOU BEEN AFRAID OF YOUR PARTNER OR EX-PARTNER?: NO
WITHIN THE LAST YEAR, HAVE TO BEEN RAPED OR FORCED TO HAVE ANY KIND OF SEXUAL ACTIVITY BY YOUR PARTNER OR EX-PARTNER?: NO

## 2022-08-22 ENCOUNTER — OFFICE VISIT (OUTPATIENT)
Dept: PRIMARY CARE CLINIC | Age: 32
End: 2022-08-22
Payer: COMMERCIAL

## 2022-08-22 VITALS
WEIGHT: 207.6 LBS | DIASTOLIC BLOOD PRESSURE: 78 MMHG | SYSTOLIC BLOOD PRESSURE: 120 MMHG | HEART RATE: 70 BPM | BODY MASS INDEX: 30.75 KG/M2 | HEIGHT: 69 IN | OXYGEN SATURATION: 98 %

## 2022-08-22 DIAGNOSIS — Z00.00 ANNUAL PHYSICAL EXAM: ICD-10-CM

## 2022-08-22 DIAGNOSIS — L21.9 SEBORRHEIC DERMATITIS: Primary | ICD-10-CM

## 2022-08-22 LAB
CHOLESTEROL, FASTING: 223 MG/DL
CHOLESTEROL/HDL RATIO: 5.3
GLUCOSE FASTING: 96 MG/DL (ref 70–99)
HDLC SERPL-MCNC: 42 MG/DL
LDL CHOLESTEROL: 161 MG/DL (ref 0–130)
TRIGLYCERIDE, FASTING: 101 MG/DL

## 2022-08-22 PROCEDURE — 99395 PREV VISIT EST AGE 18-39: CPT | Performed by: PHYSICIAN ASSISTANT

## 2022-08-22 SDOH — ECONOMIC STABILITY: FOOD INSECURITY: WITHIN THE PAST 12 MONTHS, THE FOOD YOU BOUGHT JUST DIDN'T LAST AND YOU DIDN'T HAVE MONEY TO GET MORE.: NEVER TRUE

## 2022-08-22 SDOH — ECONOMIC STABILITY: FOOD INSECURITY: WITHIN THE PAST 12 MONTHS, YOU WORRIED THAT YOUR FOOD WOULD RUN OUT BEFORE YOU GOT MONEY TO BUY MORE.: NEVER TRUE

## 2022-08-22 ASSESSMENT — ENCOUNTER SYMPTOMS
ABDOMINAL DISTENTION: 0
SHORTNESS OF BREATH: 0
EYE DISCHARGE: 0
COUGH: 0
CHEST TIGHTNESS: 0
CONSTIPATION: 0
SINUS PRESSURE: 0
VOMITING: 0
PHOTOPHOBIA: 0
SORE THROAT: 0
ABDOMINAL PAIN: 0
RHINORRHEA: 0
DIARRHEA: 0

## 2022-08-22 ASSESSMENT — PATIENT HEALTH QUESTIONNAIRE - PHQ9
SUM OF ALL RESPONSES TO PHQ QUESTIONS 1-9: 0
SUM OF ALL RESPONSES TO PHQ QUESTIONS 1-9: 0
1. LITTLE INTEREST OR PLEASURE IN DOING THINGS: 0
SUM OF ALL RESPONSES TO PHQ9 QUESTIONS 1 & 2: 0
2. FEELING DOWN, DEPRESSED OR HOPELESS: 0
SUM OF ALL RESPONSES TO PHQ QUESTIONS 1-9: 0
SUM OF ALL RESPONSES TO PHQ QUESTIONS 1-9: 0

## 2022-08-22 ASSESSMENT — SOCIAL DETERMINANTS OF HEALTH (SDOH): HOW HARD IS IT FOR YOU TO PAY FOR THE VERY BASICS LIKE FOOD, HOUSING, MEDICAL CARE, AND HEATING?: NOT HARD AT ALL

## 2022-08-22 NOTE — PROGRESS NOTES
[facility] PRIMARY CARE  [address] [place] [address] [address] [address]  [facility]pt[facility] Benito Naylor is a 33 y.o. male Established patient, who presents today for his medical conditions/complaints as noted below. Chief Complaint   Patient presents with    Annual Exam     Patient is here today to establish care with Pollo Parker. Previous patient of  Ellen Sheffield. Patient states he will need blood work ordered for work/insurance (Physical form)        HPI:     HPI: Patient is a pleasant 78-year-old male who presents today to establish care with me. Patient was a former patient of Ellen Sheffield. Patient needs a physical done for his work including lipids and glucose. No chronic conditions no medications daily. Using cream for dry skin at times. Reviewed prior notes None  Reviewed previous Labs    LDL Cholesterol (mg/dL)   Date Value   05/18/2021 113   07/17/2020 123     LDL Calculated (mg/dL)   Date Value   05/24/2019 110       (goal LDL is <100)   TSH (mIU/L)   Date Value   12/01/2015 2.20     BP Readings from Last 3 Encounters:   08/22/22 120/78   05/17/21 118/72   07/22/20 126/80          (goal 120/80)  No results found for: LABA1C  No results found for: EAG  History reviewed. No pertinent past medical history. Past Surgical History:   Procedure Laterality Date    PILONIDAL CYST EXCISION  12-18-15       Family History   Problem Relation Age of Onset    High Blood Pressure Father     Heart Attack Father     High Cholesterol Father     High Cholesterol Paternal Uncle     Diabetes Maternal Aunt     Eczema Mother        Social History     Tobacco Use    Smoking status: Never    Smokeless tobacco: Never   Substance Use Topics    Alcohol use: Yes     Comment: rare      Current Outpatient Medications   Medication Sig Dispense Refill    pimecrolimus (ELIDEL) 1 % cream Apply topically 2 times daily Apply topically 2 times daily.        No current facility-administered medications for this visit. No Known Allergies    Health Maintenance   Topic Date Due    HIV screen  Never done    Hepatitis C screen  Never done    Hepatitis A vaccine (2 of 2 - 2-dose series) 04/08/2008    Varicella vaccine (1 of 2 - 2-dose childhood series) Never done    COVID-19 Vaccine (3 - Booster for Pfizer series) 10/18/2021    Flu vaccine (1) 09/01/2022    Depression Screen  08/22/2023    DTaP/Tdap/Td vaccine (4 - Td or Tdap) 09/22/2025    Hepatitis B vaccine  Completed    Meningococcal (ACWY) vaccine  Completed    Hib vaccine  Aged Out    Pneumococcal 0-64 years Vaccine  Aged Out       Subjective:      Review of Systems   Constitutional:  Negative for chills, fever and unexpected weight change. HENT:  Negative for congestion, hearing loss, rhinorrhea, sinus pressure and sore throat. Eyes:  Negative for photophobia, discharge and visual disturbance. Respiratory:  Negative for cough, chest tightness and shortness of breath. Cardiovascular:  Negative for chest pain, palpitations and leg swelling. Gastrointestinal:  Negative for abdominal distention, abdominal pain, constipation, diarrhea and vomiting. Endocrine: Negative for polydipsia and polyuria. Genitourinary:  Negative for decreased urine volume, difficulty urinating, frequency and urgency. Musculoskeletal:  Negative for arthralgias, gait problem and myalgias. Skin:  Negative for rash. Allergic/Immunologic: Negative for food allergies. Neurological:  Negative for dizziness, weakness, numbness and headaches. Hematological:  Negative for adenopathy. Psychiatric/Behavioral:  Negative for dysphoric mood and sleep disturbance. The patient is not nervous/anxious. Objective:     /78   Pulse 70   Ht 5' 9\" (1.753 m)   Wt 207 lb 9.6 oz (94.2 kg)   SpO2 98%   BMI 30.66 kg/m²   Physical Exam  Constitutional:       General: He is not in acute distress. Appearance: Normal appearance.  He is not ill-appearing. HENT:      Head: Normocephalic and atraumatic. Right Ear: Tympanic membrane, ear canal and external ear normal.      Left Ear: Tympanic membrane, ear canal and external ear normal.      Nose: Nose normal.      Mouth/Throat:      Mouth: Mucous membranes are moist.   Eyes:      Extraocular Movements: Extraocular movements intact. Conjunctiva/sclera: Conjunctivae normal.      Pupils: Pupils are equal, round, and reactive to light. Neck:      Vascular: No carotid bruit. Cardiovascular:      Rate and Rhythm: Normal rate and regular rhythm. Pulses: Normal pulses. Heart sounds: Normal heart sounds. Pulmonary:      Effort: Pulmonary effort is normal. No respiratory distress. Breath sounds: Normal breath sounds. Abdominal:      General: Bowel sounds are normal. There is no distension. Tenderness: There is no abdominal tenderness. Musculoskeletal:         General: Normal range of motion. Cervical back: Normal range of motion and neck supple. Lymphadenopathy:      Cervical: No cervical adenopathy. Skin:     General: Skin is warm and dry. Neurological:      General: No focal deficit present. Mental Status: He is alert and oriented to person, place, and time. Psychiatric:         Mood and Affect: Mood normal.         Behavior: Behavior normal.         Thought Content: Thought content normal.       Assessment and Plan:          1. Seborrheic dermatitis  2. Annual physical exam  -     Lipid, Fasting; Future  -     Glucose, Fasting; Future           Return for Follow up if symptoms persist or worsen. Patient given educational materials - see patient instructions. Discussed use, benefit, and side effects of prescribed medications. All patient questions answered. Pt voiced understanding. Reviewed health maintenance. Instructed to continue current medications, diet and exercise. Patient agreed with treatment plan. Follow up as directed. Electronically signed by GABBY Parra on 8/22/2022 at 11:36 AM

## 2022-08-23 NOTE — RESULT ENCOUNTER NOTE
Call and advise patient that the results showed elevated cholesterol. Patient should increase diet and exercise to improve these levels.

## 2023-07-27 ENCOUNTER — OFFICE VISIT (OUTPATIENT)
Dept: PRIMARY CARE CLINIC | Age: 33
End: 2023-07-27
Payer: COMMERCIAL

## 2023-07-27 VITALS
SYSTOLIC BLOOD PRESSURE: 124 MMHG | OXYGEN SATURATION: 99 % | BODY MASS INDEX: 31.99 KG/M2 | HEART RATE: 66 BPM | HEIGHT: 69 IN | DIASTOLIC BLOOD PRESSURE: 68 MMHG | WEIGHT: 216 LBS

## 2023-07-27 DIAGNOSIS — R05.3 CHRONIC COUGH: Primary | ICD-10-CM

## 2023-07-27 PROCEDURE — 99213 OFFICE O/P EST LOW 20 MIN: CPT | Performed by: NURSE PRACTITIONER

## 2023-07-27 RX ORDER — ALBUTEROL SULFATE 90 UG/1
2 AEROSOL, METERED RESPIRATORY (INHALATION) EVERY 4 HOURS PRN
Qty: 18 G | Refills: 2 | Status: SHIPPED | OUTPATIENT
Start: 2023-07-27

## 2023-07-27 RX ORDER — ALBUTEROL SULFATE 90 UG/1
2 AEROSOL, METERED RESPIRATORY (INHALATION) EVERY 4 HOURS PRN
Qty: 18 G | Refills: 1 | Status: SHIPPED | OUTPATIENT
Start: 2023-07-27

## 2023-07-27 SDOH — ECONOMIC STABILITY: FOOD INSECURITY: WITHIN THE PAST 12 MONTHS, YOU WORRIED THAT YOUR FOOD WOULD RUN OUT BEFORE YOU GOT MONEY TO BUY MORE.: NEVER TRUE

## 2023-07-27 SDOH — ECONOMIC STABILITY: HOUSING INSECURITY
IN THE LAST 12 MONTHS, WAS THERE A TIME WHEN YOU DID NOT HAVE A STEADY PLACE TO SLEEP OR SLEPT IN A SHELTER (INCLUDING NOW)?: NO

## 2023-07-27 SDOH — ECONOMIC STABILITY: INCOME INSECURITY: HOW HARD IS IT FOR YOU TO PAY FOR THE VERY BASICS LIKE FOOD, HOUSING, MEDICAL CARE, AND HEATING?: NOT HARD AT ALL

## 2023-07-27 SDOH — ECONOMIC STABILITY: FOOD INSECURITY: WITHIN THE PAST 12 MONTHS, THE FOOD YOU BOUGHT JUST DIDN'T LAST AND YOU DIDN'T HAVE MONEY TO GET MORE.: NEVER TRUE

## 2023-07-27 ASSESSMENT — PATIENT HEALTH QUESTIONNAIRE - PHQ9
SUM OF ALL RESPONSES TO PHQ QUESTIONS 1-9: 0
SUM OF ALL RESPONSES TO PHQ QUESTIONS 1-9: 0
1. LITTLE INTEREST OR PLEASURE IN DOING THINGS: 0
2. FEELING DOWN, DEPRESSED OR HOPELESS: 0
SUM OF ALL RESPONSES TO PHQ QUESTIONS 1-9: 0
SUM OF ALL RESPONSES TO PHQ QUESTIONS 1-9: 0
SUM OF ALL RESPONSES TO PHQ9 QUESTIONS 1 & 2: 0

## 2023-07-27 ASSESSMENT — ENCOUNTER SYMPTOMS
EYE REDNESS: 0
SORE THROAT: 0
COUGH: 1
SHORTNESS OF BREATH: 0
NAUSEA: 0
DIARRHEA: 0
VOMITING: 0
RHINORRHEA: 0
WHEEZING: 0
ABDOMINAL PAIN: 0
EYE DISCHARGE: 0

## 2023-07-27 NOTE — PROGRESS NOTES
[address] [address] [facility] [address] [facility]  [address] [facility] [address]  Dept: [address] [place] Nazia Avila is a 27 y.o. male Established patient, who presents today for his medical conditions/complaints as noted below. Chief Complaint   Patient presents with    Cough     [facility] Dx- sinus infection last week. Pt states had cough for a while, now worsening        HPI:     Cough  This is a chronic problem. Episode onset: He has it for years but it was much worse when he had sinus infection. The problem has been waxing and waning. The problem occurs every few minutes. The cough is Non-productive. Associated symptoms include postnasal drip. Pertinent negatives include no chest pain, chills, eye redness, fever, headaches, myalgias, rash, rhinorrhea, sore throat, shortness of breath or wheezing. Associated symptoms comments: Post nasal drip. Exacerbated by: It is related to humidity. It gets better in humidity. It gets worse laugh, talk and when it is cold out. Treatments tried: OTC allergy medication. Over the counter cough medication is not effective    Reviewed prior notes: None   Reviewed previous:   Urgent care    LDL Cholesterol (mg/dL)   Date Value   08/22/2022 161 (H)   05/18/2021 113   07/17/2020 123     LDL Calculated (mg/dL)   Date Value   05/24/2019 110       (goal LDL is <100)   TSH (mIU/L)   Date Value   12/01/2015 2.20     BP Readings from Last 3 Encounters:   07/27/23 124/68   08/22/22 120/78   05/17/21 118/72          (goal 120/80)    No past medical history on file.    Past Surgical History:   Procedure Laterality Date    PILONIDAL CYST EXCISION  12-18-15       Family History   Problem Relation Age of Onset    High Blood Pressure Father     Heart Attack Father     High Cholesterol Father     High Cholesterol Paternal Uncle     Diabetes Maternal Aunt     Eczema Mother        Social History     Tobacco Use    Smoking status: Never    Smokeless tobacco: Never

## 2023-07-27 NOTE — PATIENT INSTRUCTIONS
Rescue inhaler as needed  Pulmonary Function Test - complete  Follow through will allergy testing.   If pulmonary function test is negative and allergy test negative then consider GERD

## 2025-02-18 ENCOUNTER — OFFICE VISIT (OUTPATIENT)
Dept: PRIMARY CARE CLINIC | Age: 35
End: 2025-02-18

## 2025-02-18 VITALS
BODY MASS INDEX: 34.51 KG/M2 | DIASTOLIC BLOOD PRESSURE: 70 MMHG | HEIGHT: 69 IN | SYSTOLIC BLOOD PRESSURE: 132 MMHG | OXYGEN SATURATION: 98 % | HEART RATE: 86 BPM | WEIGHT: 233 LBS

## 2025-02-18 DIAGNOSIS — E78.5 HYPERLIPIDEMIA, UNSPECIFIED HYPERLIPIDEMIA TYPE: ICD-10-CM

## 2025-02-18 DIAGNOSIS — Z00.00 ANNUAL PHYSICAL EXAM: Primary | ICD-10-CM

## 2025-02-18 DIAGNOSIS — H10.9 CONJUNCTIVITIS OF BOTH EYES, UNSPECIFIED CONJUNCTIVITIS TYPE: ICD-10-CM

## 2025-02-18 DIAGNOSIS — J40 BRONCHITIS: ICD-10-CM

## 2025-02-18 DIAGNOSIS — J06.9 UPPER RESPIRATORY TRACT INFECTION, UNSPECIFIED TYPE: ICD-10-CM

## 2025-02-18 RX ORDER — AZITHROMYCIN 250 MG/1
TABLET, FILM COATED ORAL
Qty: 6 TABLET | Refills: 0 | Status: SHIPPED | OUTPATIENT
Start: 2025-02-18 | End: 2025-02-28

## 2025-02-18 RX ORDER — BENZONATATE 200 MG/1
200 CAPSULE ORAL 3 TIMES DAILY PRN
COMMUNITY
Start: 2025-02-16

## 2025-02-18 RX ORDER — PREDNISONE 20 MG/1
20 TABLET ORAL 2 TIMES DAILY
Qty: 10 TABLET | Refills: 0 | Status: SHIPPED | OUTPATIENT
Start: 2025-02-18 | End: 2025-02-23

## 2025-02-18 RX ORDER — POLYMYXIN B SULFATE AND TRIMETHOPRIM 1; 10000 MG/ML; [USP'U]/ML
1 SOLUTION OPHTHALMIC 4 TIMES DAILY
COMMUNITY
Start: 2025-02-16 | End: 2025-02-23

## 2025-02-18 SDOH — ECONOMIC STABILITY: FOOD INSECURITY: WITHIN THE PAST 12 MONTHS, THE FOOD YOU BOUGHT JUST DIDN'T LAST AND YOU DIDN'T HAVE MONEY TO GET MORE.: NEVER TRUE

## 2025-02-18 SDOH — ECONOMIC STABILITY: FOOD INSECURITY: WITHIN THE PAST 12 MONTHS, YOU WORRIED THAT YOUR FOOD WOULD RUN OUT BEFORE YOU GOT MONEY TO BUY MORE.: NEVER TRUE

## 2025-02-18 ASSESSMENT — PATIENT HEALTH QUESTIONNAIRE - PHQ9
DEPRESSION UNABLE TO ASSESS: FUNCTIONAL CAPACITY MOTIVATION LIMITS ACCURACY
1. LITTLE INTEREST OR PLEASURE IN DOING THINGS: NOT AT ALL
SUM OF ALL RESPONSES TO PHQ QUESTIONS 1-9: 0
2. FEELING DOWN, DEPRESSED OR HOPELESS: NOT AT ALL
SUM OF ALL RESPONSES TO PHQ QUESTIONS 1-9: 0
SUM OF ALL RESPONSES TO PHQ QUESTIONS 1-9: 0
SUM OF ALL RESPONSES TO PHQ9 QUESTIONS 1 & 2: 0
SUM OF ALL RESPONSES TO PHQ QUESTIONS 1-9: 0

## 2025-02-18 NOTE — PROGRESS NOTES
[facility]  [facility] PRIMARY CARE  [address]  [facility] [address]  Dept: [phone]    Wilder Wadsworth is a 34 y.o. male Established patient, who presents today for his medical conditions/complaints as noted below.      Chief Complaint   Patient presents with    Follow-up     Patient states they the following concerns urgent care twice, bronchitis, double pink eye        HPI:     History of Present Illness  The patient is a pleasant 34-year-old male who presents today with concerns of follow-up for pinkeye and cough.    He has sought medical attention at an urgent care facility on two occasions due to a persistent cough, which has escalated to the point of inducing vomiting and shortness of breath. The cough is particularly severe during physical activity or movement, and it is exacerbated by cold weather. He was prescribed benzonatate and an inhaler, which have provided some relief. However, the cough remains most severe in the mornings and at night, with variable intensity in the afternoons. The cough is less severe when he is seated indoors but worsens when he lies down, engages in activities, or speaks. He was not prescribed any medication during his initial visit to the urgent care. He has returned to work but has been asked to leave meetings and offices due to his condition.    He also reports a history of double pinkeye.    MEDICATIONS  Benzonatate      Reviewed prior notes None  Reviewed previous Labs    LDL Cholesterol (mg/dL)   Date Value   08/22/2022 161 (H)     LDL Calculated (mg/dL)   Date Value   05/24/2019 110     HDL (mg/dL)   Date Value   08/22/2022 42       (goal LDL is <100)   TSH (mIU/L)   Date Value   12/01/2015 2.20     BP Readings from Last 3 Encounters:   02/18/25 132/70   07/27/23 124/68   08/22/22 120/78          (goal 120/80)  No results found for: \"LABA1C\"  History reviewed. No pertinent past medical history.   Past Surgical History:   Procedure

## 2025-09-04 ENCOUNTER — RESULTS FOLLOW-UP (OUTPATIENT)
Dept: PRIMARY CARE CLINIC | Age: 35
End: 2025-09-04